# Patient Record
Sex: FEMALE | URBAN - METROPOLITAN AREA
[De-identification: names, ages, dates, MRNs, and addresses within clinical notes are randomized per-mention and may not be internally consistent; named-entity substitution may affect disease eponyms.]

---

## 2019-05-20 ENCOUNTER — IMPORTED ENCOUNTER (OUTPATIENT)
Dept: URBAN - METROPOLITAN AREA CLINIC 9 | Facility: CLINIC | Age: 69
End: 2019-05-20

## 2019-06-20 ENCOUNTER — IMPORTED ENCOUNTER (OUTPATIENT)
Dept: URBAN - METROPOLITAN AREA CLINIC 9 | Facility: CLINIC | Age: 69
End: 2019-06-20

## 2020-06-19 ENCOUNTER — IMPORTED ENCOUNTER (OUTPATIENT)
Dept: URBAN - METROPOLITAN AREA CLINIC 9 | Facility: CLINIC | Age: 70
End: 2020-06-19

## 2020-07-27 NOTE — PATIENT DISCUSSION
REC: LOWER IOP OU, DISCUSSED OPTIONS. PT NOT COMPLIANT W/DROPS. CONTINUE DORZOLAMIDE/TIMOLOL OU BID AND ADD TRAVATAN OU QHS. IF IOP NOT LOW ENOUGH NEXT VISIT, T/C GLAUCOMA SX. DISCUSSED PERMANENT VISION LOSS IF PT DOESN'T USE DROPS AS INSTRUCTED.

## 2020-08-17 NOTE — PATIENT DISCUSSION
IOP BETTER.  PT TO CONTINUE DORZOLAMIDE/TIMOLOL AND TRAVATAN.    PT TO CONTINUE TO FOLLOW W/ DLH EVERY 3-4 MONTHS W/ OCT, HVF AND OPTIC NERVE EVAL.  IF CHANGES, SEND BACK TO [facility].  STRESSED COMPLIANCE.  WRITTEN INSTRUCTIONS GIVEN TO PT AND DAUGHTER.

## 2020-11-18 NOTE — PATIENT DISCUSSION
IOP BETTER and stable.  PT TO CONTINUE DORZOLAMIDE/TIMOLOL AND TRAVATAN.    PT TO CONTINUE TO FOLLOW W/ DLH EVERY 3-4 MONTHS W/ OCT, HVF AND OPTIC NERVE EVAL.  IF CHANGES, SEND BACK TO [facility].  STRESSED COMPLIANCE.  WRITTEN INSTRUCTIONS GIVEN TO PT AND DAUGHTER.

## 2021-06-28 NOTE — PATIENT DISCUSSION
OCT SHOWS WORSENING IN OU. REC: LOWER IOP OU. DISCUSSED OPTIONS OF ADDING ROCKLATAN VS. BETTER COMPLIANCE. PT'S CARE TAKER WILL TRY TO HELP W/ DROPS AS MUCH AS SHE CAN. I WILL CONTACT PATIENT'S SON AND SPEAK ABOUT COMPLIANCE AND DURYSTA. IF HE WANTS PATIENT TO TRY DURYSTA, HE MUST COME IN WITH HER NEXT VISIT. SON'S INFORMATION: Twyla Dodson [phone].

## 2021-09-01 NOTE — PATIENT DISCUSSION
IOP BETTER.  PT HAS BEEN MORE COMPLIANT W/ GTTS.  CONTINUE TO FOLLOW W/ DLH.  IF NON-COMPLIANT OR CHANGES SEEN, SEND BACK TO [address] [facility] [place].

## 2021-09-16 NOTE — PATIENT DISCUSSION
IOP BETTER and stable.  PT TO CONTINUE DORZOLAMIDE/TIMOLOL AND TRAVATAN.    PT TO CONTINUE TO FOLLOW W/ DLH EVERY 3-4 MONTHS W/ OCT, HVF AND OPTIC NERVE EVAL.  IF CHANGES, SEND BACK TO [facility].  STRESSED COMPLIANCE.  WRITTEN INSTRUCTIONS GIVEN TO PT AND DAUGHTER. No

## 2021-10-16 ASSESSMENT — VISUAL ACUITY
OS_SC: 20/40 SN
OS_SC: 20/40 SN
OS_CC: 20/70 SN
OS_CC: 20/50 SN
OD_CC: 20/40 SN
OD_SC: 20/60 SN
OS_SC: CF 2FT SN

## 2021-10-16 ASSESSMENT — KERATOMETRY
OD_K2POWER_DIOPTERS: 6.875
OS_AXISANGLE2_DEGREES: 95
OD_AXISANGLE2_DEGREES: 90
OS_K2POWER_DIOPTERS: 6.875
OS_K1POWER_DIOPTERS: 7.375
OS_AXISANGLE_DEGREES: 5
OD_K1POWER_DIOPTERS: 7.5
OD_AXISANGLE_DEGREES: 180

## 2021-10-16 ASSESSMENT — TONOMETRY
OS_IOP_MMHG: 16
OS_IOP_MMHG: 22
OD_IOP_MMHG: 15

## 2022-01-08 NOTE — PATIENT DISCUSSION
[place] grid at home. MVI/AREDS discussed. Patient to call if any changes in vision or grid card.
Discussed AREDS 2 supplements, UV protection and green leafy vegetables.
Glasses Rx given.
IOP BETTER.  PT HAS BEEN MORE COMPLIANT W/ GTTS.  CONTINUE TO FOLLOW W/ DLH.  IF NON-COMPLIANT OR CHANGES SEEN, SEND BACK TO [address] [facility] [place].
IOP slightly high today.  RTC for IOP check x 1 month.  If still high then send to [facility] for evaluation.
Monitor.
No Retinal holes, Tears or Detachments.
Observe.
Patient achieved a 15% reduction in IOP from pretreatment levels or a plan is in place to achieve this goal.
Patient given Rx for glasses.
Patient understands condition, prognosis and need for follow up care.
Recommended observation.
Retinal tear and detachment warning symptoms reviewed and patient instructed to call immediately if increasing floaters, flashes, or decreasing peripheral vision.
order Fundus Image.
FAMILY HISTORY:  Father  Still living? Yes, Estimated age: 61-70  FH: type 2 diabetes, Age at diagnosis: Age Unknown    Mother  Still living? Yes, Estimated age: 61-70  FH: brain aneurysm, Age at diagnosis: Age Unknown

## 2022-02-11 NOTE — PATIENT DISCUSSION
IOP slightly high today.  RTC for IOP check x 1 month.  If still high then send to [facility] for evaluation.

## 2022-02-11 NOTE — PATIENT DISCUSSION
IOP BETTER.  PT HAS BEEN MORE COMPLIANT W/ GTTS.  CONTINUE TO FOLLOW W/ DLH.  IF NON-COMPLIANT OR CHANGES SEEN, SEND BACK TO [address] [facility] [place].

## 2022-04-04 NOTE — PATIENT DISCUSSION
IOP slightly high today.  RTC for IOP check x 1 month.  If still high then send to [facility] for evaluation.

## 2022-04-04 NOTE — PATIENT DISCUSSION
IOP BETTER.  PT HAS BEEN MORE COMPLIANT W/ GTTS.  CONTINUE TO FOLLOW W/ DLH.  IF NON-COMPLIANT OR CHANGES SEEN, SEND BACK TO [address] [facility] [place].

## 2022-06-28 NOTE — PATIENT DISCUSSION
IOP BETTER TODAY BUT FLUCTUATING TOO MUCH.  DISCUSSED ADDING SLT OU HOWEVER ADVISED WOULD STILL NEED TO CONTINUE GTTS DUE TO THE SEVERITY OF GLAUCOMA AND LIKELY WILL NEED GLAUCOMA SURGERY.  PT WISHES TO CALL BACK.

## 2022-07-04 RX ORDER — HYDROCODONE BITARTRATE AND ACETAMINOPHEN 10; 325 MG/1; MG/1
TABLET ORAL
COMMUNITY

## 2022-07-04 RX ORDER — LANSOPRAZOLE 30 MG/1
CAPSULE, DELAYED RELEASE ORAL
COMMUNITY

## 2022-07-04 RX ORDER — GLIPIZIDE 5 MG/1
TABLET, FILM COATED, EXTENDED RELEASE ORAL
COMMUNITY

## 2022-07-04 RX ORDER — GLIPIZIDE 5 MG/1
TABLET ORAL
COMMUNITY

## 2022-07-04 RX ORDER — ESOMEPRAZOLE MAGNESIUM 40 MG/1
CAPSULE, DELAYED RELEASE ORAL
COMMUNITY

## 2022-07-04 RX ORDER — PRAVASTATIN SODIUM 80 MG/1
TABLET ORAL
COMMUNITY

## 2022-07-04 RX ORDER — OLMESARTAN MEDOXOMIL 5 MG/1
TABLET ORAL
COMMUNITY

## 2022-07-04 RX ORDER — BENAZEPRIL/HYDROCHLOROTHIAZIDE 20 MG-25MG
TABLET ORAL
COMMUNITY

## 2024-01-04 ENCOUNTER — NEW PATIENT (OUTPATIENT)
Dept: URBAN - METROPOLITAN AREA CLINIC 14 | Facility: CLINIC | Age: 74
End: 2024-01-04

## 2024-01-04 DIAGNOSIS — H25.11: ICD-10-CM

## 2024-01-04 PROCEDURE — 92136 OPHTHALMIC BIOMETRY: CPT

## 2024-01-04 PROCEDURE — 99204 OFFICE O/P NEW MOD 45 MIN: CPT

## 2024-01-04 ASSESSMENT — VISUAL ACUITY
OS_CC: 20/50
OS_BCVA: 20/30
OS_PH: 20/40
OD_CC: 20/50
OD_BCVA: 20/40

## 2024-01-04 ASSESSMENT — TONOMETRY
OD_IOP_MMHG: 11
OS_IOP_MMHG: 10

## 2024-01-04 ASSESSMENT — KERATOMETRY
OS_AXISANGLE2_DEGREES: 97
OD_K2POWER_DIOPTERS: 48.00
OS_AXISANGLE_DEGREES: 07
OD_AXISANGLE2_DEGREES: 95
OD_AXISANGLE_DEGREES: 005
OS_K2POWER_DIOPTERS: 48.75
OS_K1POWER_DIOPTERS: 46.00
OD_K1POWER_DIOPTERS: 45.00

## 2024-01-24 ENCOUNTER — POST-OP (OUTPATIENT)
Dept: URBAN - METROPOLITAN AREA CLINIC 14 | Facility: CLINIC | Age: 74
End: 2024-01-24

## 2024-01-24 DIAGNOSIS — Z96.1: ICD-10-CM

## 2024-01-24 PROCEDURE — 99024 POSTOP FOLLOW-UP VISIT: CPT

## 2024-01-24 ASSESSMENT — VISUAL ACUITY
OU_SC: 20/40+2
OD_SC: 20/40+1

## 2024-01-24 ASSESSMENT — TONOMETRY
OS_IOP_MMHG: 18
OD_IOP_MMHG: 21

## 2024-02-27 ENCOUNTER — POST-OP (OUTPATIENT)
Dept: URBAN - METROPOLITAN AREA CLINIC 14 | Facility: CLINIC | Age: 74
End: 2024-02-27

## 2024-02-27 DIAGNOSIS — Z96.1: ICD-10-CM

## 2024-02-27 PROCEDURE — 99024 POSTOP FOLLOW-UP VISIT: CPT

## 2024-02-27 ASSESSMENT — VISUAL ACUITY
OU_SC: 20/40-1
OS_SC: 20/40
OD_BCVA: 20/30+2
OD_SC: 20/40-1
OS_BCVA: 20/30-1

## 2024-02-27 ASSESSMENT — KERATOMETRY
OD_AXISANGLE_DEGREES: 174
OD_K1POWER_DIOPTERS: 45.00
OS_K1POWER_DIOPTERS: 46.00
OS_K2POWER_DIOPTERS: 49.75
OD_AXISANGLE2_DEGREES: 84
OS_AXISANGLE_DEGREES: 176
OD_K2POWER_DIOPTERS: 49.25
OS_AXISANGLE2_DEGREES: 86

## 2024-02-27 ASSESSMENT — TONOMETRY
OD_IOP_MMHG: 17
OS_IOP_MMHG: 15

## 2025-08-04 ENCOUNTER — CONSULTATION/EVALUATION (OUTPATIENT)
Age: 75
End: 2025-08-04

## 2025-08-04 DIAGNOSIS — H02.835: ICD-10-CM

## 2025-08-04 DIAGNOSIS — H02.832: ICD-10-CM

## 2025-08-04 PROCEDURE — 92285 EXTERNAL OCULAR PHOTOGRAPHY: CPT

## 2025-08-04 PROCEDURE — 99213 OFFICE O/P EST LOW 20 MIN: CPT
